# Patient Record
Sex: MALE | Race: WHITE | NOT HISPANIC OR LATINO | Employment: STUDENT | URBAN - METROPOLITAN AREA
[De-identification: names, ages, dates, MRNs, and addresses within clinical notes are randomized per-mention and may not be internally consistent; named-entity substitution may affect disease eponyms.]

---
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
## 2025-01-13 ENCOUNTER — TELEPHONE (OUTPATIENT)
Facility: CLINIC | Age: 20
End: 2025-01-13
Payer: COMMERCIAL

## 2025-01-13 DIAGNOSIS — Z00.00 HEALTHCARE MAINTENANCE: Primary | ICD-10-CM

## 2025-01-13 NOTE — TELEPHONE ENCOUNTER
Per pt, he would like an order for titers for Hep B, needed for paramedic school. He will call back later to schedule his physical for May. He will go to a  lab. Please advise.

## 2025-01-15 ENCOUNTER — LAB (OUTPATIENT)
Dept: LAB | Facility: LAB | Age: 20
End: 2025-01-15
Payer: COMMERCIAL

## 2025-01-15 DIAGNOSIS — Z00.00 HEALTHCARE MAINTENANCE: ICD-10-CM

## 2025-01-15 DIAGNOSIS — D58.2 ELEVATED HEMOGLOBIN (CMS-HCC): ICD-10-CM

## 2025-01-15 LAB
BASOPHILS # BLD AUTO: 0.06 X10*3/UL (ref 0–0.1)
BASOPHILS NFR BLD AUTO: 0.8 %
EOSINOPHIL # BLD AUTO: 0.06 X10*3/UL (ref 0–0.7)
EOSINOPHIL NFR BLD AUTO: 0.8 %
ERYTHROCYTE [DISTWIDTH] IN BLOOD BY AUTOMATED COUNT: 12.7 % (ref 11.5–14.5)
HBV SURFACE AB SER-ACNC: 267.7 MIU/ML
HCT VFR BLD AUTO: 49.1 % (ref 41–52)
HGB BLD-MCNC: 16.6 G/DL (ref 13.5–17.5)
IMM GRANULOCYTES # BLD AUTO: 0.02 X10*3/UL (ref 0–0.7)
IMM GRANULOCYTES NFR BLD AUTO: 0.3 % (ref 0–0.9)
LYMPHOCYTES # BLD AUTO: 2.4 X10*3/UL (ref 1.2–4.8)
LYMPHOCYTES NFR BLD AUTO: 32.8 %
MCH RBC QN AUTO: 29.4 PG (ref 26–34)
MCHC RBC AUTO-ENTMCNC: 33.8 G/DL (ref 32–36)
MCV RBC AUTO: 87 FL (ref 80–100)
MONOCYTES # BLD AUTO: 0.88 X10*3/UL (ref 0.1–1)
MONOCYTES NFR BLD AUTO: 12 %
NEUTROPHILS # BLD AUTO: 3.89 X10*3/UL (ref 1.2–7.7)
NEUTROPHILS NFR BLD AUTO: 53.3 %
NRBC BLD-RTO: 0 /100 WBCS (ref 0–0)
PLATELET # BLD AUTO: 272 X10*3/UL (ref 150–450)
RBC # BLD AUTO: 5.64 X10*6/UL (ref 4.5–5.9)
WBC # BLD AUTO: 7.3 X10*3/UL (ref 4.4–11.3)

## 2025-01-15 PROCEDURE — 86706 HEP B SURFACE ANTIBODY: CPT

## 2025-01-15 PROCEDURE — 85025 COMPLETE CBC W/AUTO DIFF WBC: CPT

## 2025-01-22 ENCOUNTER — TELEPHONE (OUTPATIENT)
Facility: CLINIC | Age: 20
End: 2025-01-22
Payer: COMMERCIAL

## 2025-01-22 NOTE — TELEPHONE ENCOUNTER
Patient called Needs A note from Doctor  for his school stating his Titer was positive,  Needs sign from the doctor .  Please sign and send through to his My Chart. When completed. Please let him Know when it is sent.

## 2025-05-28 ENCOUNTER — TELEPHONE (OUTPATIENT)
Facility: CLINIC | Age: 20
End: 2025-05-28
Payer: COMMERCIAL

## 2025-05-28 DIAGNOSIS — Z13.220 LIPID SCREENING: ICD-10-CM

## 2025-05-28 DIAGNOSIS — Z00.00 HEALTHCARE MAINTENANCE: Primary | ICD-10-CM

## 2025-06-01 LAB
ALBUMIN SERPL-MCNC: 4.9 G/DL (ref 3.6–5.1)
ALP SERPL-CCNC: 55 U/L (ref 36–130)
ALT SERPL-CCNC: 34 U/L (ref 9–46)
ANION GAP SERPL CALCULATED.4IONS-SCNC: 10 MMOL/L (CALC) (ref 7–17)
AST SERPL-CCNC: 20 U/L (ref 10–40)
BASOPHILS # BLD AUTO: 58 CELLS/UL (ref 0–200)
BASOPHILS NFR BLD AUTO: 0.8 %
BILIRUB SERPL-MCNC: 1.1 MG/DL (ref 0.2–1.2)
BUN SERPL-MCNC: 19 MG/DL (ref 7–25)
CALCIUM SERPL-MCNC: 9.6 MG/DL (ref 8.6–10.3)
CHLORIDE SERPL-SCNC: 103 MMOL/L (ref 98–110)
CHOLEST SERPL-MCNC: 154 MG/DL
CHOLEST/HDLC SERPL: 4.3 (CALC)
CO2 SERPL-SCNC: 27 MMOL/L (ref 20–32)
CREAT SERPL-MCNC: 1.05 MG/DL (ref 0.6–1.24)
EGFRCR SERPLBLD CKD-EPI 2021: 104 ML/MIN/1.73M2
EOSINOPHIL # BLD AUTO: 50 CELLS/UL (ref 15–500)
EOSINOPHIL NFR BLD AUTO: 0.7 %
ERYTHROCYTE [DISTWIDTH] IN BLOOD BY AUTOMATED COUNT: 12.8 % (ref 11–15)
GLUCOSE SERPL-MCNC: 87 MG/DL (ref 65–99)
HCT VFR BLD AUTO: 50.2 % (ref 38.5–50)
HDLC SERPL-MCNC: 36 MG/DL
HGB BLD-MCNC: 17 G/DL (ref 13.2–17.1)
IGNF NEG CNTRL BLD: NORMAL
LDLC SERPL CALC-MCNC: 92 MG/DL (CALC)
LYMPHOCYTES # BLD AUTO: 2333 CELLS/UL (ref 850–3900)
LYMPHOCYTES NFR BLD AUTO: 32.4 %
M TB IFN-G BLD-IMP: NEGATIVE
MCH RBC QN AUTO: 29.9 PG (ref 27–33)
MCHC RBC AUTO-ENTMCNC: 33.9 G/DL (ref 32–36)
MCV RBC AUTO: 88.4 FL (ref 80–100)
MITOGEN IGNF.SPOT COUNT BLD: NORMAL
MONOCYTES # BLD AUTO: 871 CELLS/UL (ref 200–950)
MONOCYTES NFR BLD AUTO: 12.1 %
NEUTROPHILS # BLD AUTO: 3888 CELLS/UL (ref 1500–7800)
NEUTROPHILS NFR BLD AUTO: 54 %
NONHDLC SERPL-MCNC: 118 MG/DL (CALC)
PLATELET # BLD AUTO: 254 THOUSAND/UL (ref 140–400)
PMV BLD REES-ECKER: 9.6 FL (ref 7.5–12.5)
POTASSIUM SERPL-SCNC: 3.7 MMOL/L (ref 3.5–5.3)
PROT SERPL-MCNC: 7.5 G/DL (ref 6.1–8.1)
QUEST PANEL A SPOT COUNT: 0
QUEST PANEL B SPOT COUNT: 0
RBC # BLD AUTO: 5.68 MILLION/UL (ref 4.2–5.8)
SODIUM SERPL-SCNC: 140 MMOL/L (ref 135–146)
TRIGL SERPL-MCNC: 162 MG/DL
WBC # BLD AUTO: 7.2 THOUSAND/UL (ref 3.8–10.8)

## 2025-08-05 ENCOUNTER — APPOINTMENT (OUTPATIENT)
Facility: CLINIC | Age: 20
End: 2025-08-05
Payer: COMMERCIAL

## 2025-08-05 VITALS
BODY MASS INDEX: 37.19 KG/M2 | OXYGEN SATURATION: 97 % | SYSTOLIC BLOOD PRESSURE: 136 MMHG | HEIGHT: 77 IN | TEMPERATURE: 98.5 F | RESPIRATION RATE: 18 BRPM | WEIGHT: 315 LBS | DIASTOLIC BLOOD PRESSURE: 68 MMHG | HEART RATE: 94 BPM

## 2025-08-05 DIAGNOSIS — Z00.00 HEALTHCARE MAINTENANCE: Primary | ICD-10-CM

## 2025-08-05 DIAGNOSIS — R03.0 ELEVATED BLOOD PRESSURE READING: ICD-10-CM

## 2025-08-05 DIAGNOSIS — E66.813 CLASS 3 SEVERE OBESITY DUE TO EXCESS CALORIES WITHOUT SERIOUS COMORBIDITY WITH BODY MASS INDEX (BMI) OF 40.0 TO 44.9 IN ADULT: ICD-10-CM

## 2025-08-05 PROCEDURE — 1036F TOBACCO NON-USER: CPT | Performed by: FAMILY MEDICINE

## 2025-08-05 PROCEDURE — 99395 PREV VISIT EST AGE 18-39: CPT | Performed by: FAMILY MEDICINE

## 2025-08-05 PROCEDURE — 3008F BODY MASS INDEX DOCD: CPT | Performed by: FAMILY MEDICINE

## 2025-08-05 ASSESSMENT — PROMIS GLOBAL HEALTH SCALE
RATE_AVERAGE_PAIN: 0
RATE_PHYSICAL_HEALTH: GOOD
RATE_MENTAL_HEALTH: VERY GOOD
RATE_QUALITY_OF_LIFE: VERY GOOD
CARRYOUT_SOCIAL_ACTIVITIES: VERY GOOD
RATE_SOCIAL_SATISFACTION: GOOD
CARRYOUT_PHYSICAL_ACTIVITIES: COMPLETELY
EMOTIONAL_PROBLEMS: RARELY
RATE_GENERAL_HEALTH: GOOD

## 2025-08-05 ASSESSMENT — ENCOUNTER SYMPTOMS
HEADACHES: 0
SHORTNESS OF BREATH: 0

## 2025-08-05 ASSESSMENT — PATIENT HEALTH QUESTIONNAIRE - PHQ9
1. LITTLE INTEREST OR PLEASURE IN DOING THINGS: NOT AT ALL
2. FEELING DOWN, DEPRESSED OR HOPELESS: NOT AT ALL
SUM OF ALL RESPONSES TO PHQ9 QUESTIONS 1 AND 2: 0

## 2025-08-05 NOTE — ASSESSMENT & PLAN NOTE
Orders:    Referral to Nutrition Services; Future    Follow Up In Primary Care - Established; Future

## 2025-08-05 NOTE — ASSESSMENT & PLAN NOTE
Patient was instructed to record blood pressures (using an arm BP monitor) at home 1-2 times per day (per AHA guidelines) and to follow up in office for a blood pressure recheck in 12 weeks.  I also encouraged low-sodium diet and regular exercise.  I also discussed with patient the importance of good blood pressure control to avoid long-term complications such as heart attack and stroke.      Orders:    Follow Up In Primary Care - Established; Future

## 2025-08-05 NOTE — PROGRESS NOTES
"Subjective     Tera Zhang is a 20 y.o. male who presents for Annual Exam.    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  He is doing well.  He also plans to go into [employer] once he passes his paramedic exam.  He is training for the physical fitness test for [employer].    He had labs done fasting through me in may 2025.  His trigs were mildly elevated 162.    He had his hep B vaccines then a positive hep b titer showing immunity in Jan 2025.      He has immunity to MMR and varicella as of titers from 5/2024.      Patient denies snoring, gasping/choking at night, excessive daytime sleepiness.  No hx of MAAME.      Diet - more balanced.  Less junk food. Meal prepping.     Physically active - cardio and weight lifting.     Mood is normal.     Review of Systems   Respiratory:  Negative for shortness of breath.    Cardiovascular:  Negative for chest pain.   Neurological:  Negative for headaches.       Objective     Vitals:    08/05/25 0957 08/05/25 1043   BP: 140/78 136/68   BP Location: Right arm    Patient Position: Sitting    BP Cuff Size: Large adult    Pulse: 94    Resp: 18    Temp: 36.9 °C (98.5 °F)    TempSrc: Temporal    SpO2: 97%    Weight: (!) 158 kg (347 lb 9.6 oz)    Height: 1.95 m (6' 4.77\")         No current outpatient medications     RX Allergies[1]     Surgical History[2]     Social History[3]     Family History[4]     Immunization History   Administered Date(s) Administered    COVID-19, mRNA, LNP-S, PF, 30 mcg/0.3 mL dose 05/13/2021, 06/03/2021, 12/29/2021    DTaP vaccine, pediatric (DAPTACEL) 12/15/2006, 06/11/2009    DTaP, Unspecified 2005, 2005, 2005    Flu vaccine (IIV4), preservative free *Check age/dose* 09/07/2017, 10/03/2018, 10/08/2019, 10/13/2020, 10/19/2021, 10/26/2022    Flu vaccine, trivalent, preservative free, age 6 months and greater (Fluarix/Fluzone/Flulaval) 11/10/2006, 12/15/2008, 10/15/2024    HPV 9-valent " vaccine (GARDASIL 9) 09/07/2017, 10/03/2018    Hepatitis A vaccine, pediatric/adolescent (HAVRIX, VAQTA) 12/30/2013, 12/01/2014    Hepatitis B vaccine, 19 yrs and under (RECOMBIVAX, ENGERIX) 2005, 2005, 2005, 06/04/2024, 12/09/2024    Hepatitis B vaccine, adult *Check Product/Dose* 2005, 07/03/2024    HiB PRP-T conjugate vaccine (HIBERIX, ACTHIB) 06/02/2006    HiB, unspecified 2005, 2005, 2005    Influenza, Unspecified 2005, 01/20/2006, 12/04/2009, 12/01/2014    Influenza, seasonal, injectable 12/04/2007, 12/04/2009, 12/30/2013, 10/07/2016, 09/11/2017    MMR vaccine, subcutaneous (MMR II) 09/08/2006, 06/11/2009    Meningococcal ACWY vaccine (MENVEO) 10/19/2021    Meningococcal ACWY-D (Menactra) 4-valent conjugate vaccine 09/06/2016, 10/19/2021    Pneumococcal Conjugate PCV 7 2005, 2005, 2005, 06/02/2006    Pneumococcal polysaccharide vaccine, 23-valent, age 2 years and older (PNEUMOVAX 23) 2005, 2005, 2005, 06/02/2006    Polio, Unspecified 2005, 2005    Poliovirus vaccine, subcutaneous (IPOL) 2005, 2005, 12/15/2006, 06/11/2009    Tdap vaccine, age 7 year and older (BOOSTRIX, ADACEL) 09/06/2016, 05/17/2024    Varicella vaccine, subcutaneous (VARIVAX) 09/08/2006, 06/11/2009        Lab Results   Component Value Date    WBC 7.2 05/29/2025    RBC 5.68 05/29/2025    HGB 17.0 05/29/2025    HCT 50.2 (H) 05/29/2025    MCV 88.4 05/29/2025    MCH 29.9 05/29/2025    MCHC 33.9 05/29/2025     05/29/2025    MPV 9.6 05/29/2025     Lab Results   Component Value Date    GLUCOSE 87 05/29/2025     05/29/2025    K 3.7 05/29/2025     05/29/2025    CO2 27 05/29/2025    ANIONGAP 10 05/29/2025    BUN 19 05/29/2025    CREATININE 1.05 05/29/2025    CALCIUM 9.6 05/29/2025    ALBUMIN 4.9 05/29/2025    ALKPHOS 55 05/29/2025    PROT 7.5 05/29/2025    AST 20 05/29/2025    BILITOT 1.1 05/29/2025    ALT 34 05/29/2025       Lab Results   Component Value Date    CHOL 154 05/29/2025    HDL 36 (L) 05/29/2025    CHHDL 4.3 05/29/2025    LDLCALC 92 05/29/2025    VLDL 21 05/23/2024    TRIG 162 (H) 05/29/2025      Lab Results   Component Value Date    TSH 1.87 07/10/2023      Lab Results   Component Value Date    VITD25 25 (A) 07/10/2023      Lab Results   Component Value Date    HGBA1C 4.9 05/23/2024    [id number] 94 05/23/2024       Physical Exam  Vitals reviewed.   Constitutional:       General: He is not in acute distress.     Appearance: Normal appearance. He is obese. He is not ill-appearing.   HENT:      Head: Normocephalic and atraumatic.      Right Ear: Tympanic membrane, ear canal and external ear normal.      Left Ear: Tympanic membrane, ear canal and external ear normal.      Mouth/Throat:      Mouth: Mucous membranes are moist.      Pharynx: No oropharyngeal exudate or posterior oropharyngeal erythema.   Neck:      Thyroid: No thyroid mass or thyromegaly.     Cardiovascular:      Rate and Rhythm: Normal rate and regular rhythm.      Heart sounds: No murmur heard.  Pulmonary:      Effort: No respiratory distress.      Breath sounds: Normal breath sounds. No wheezing, rhonchi or rales.   Abdominal:      General: There is no distension.      Palpations: Abdomen is soft.      Tenderness: There is no abdominal tenderness.   Lymphadenopathy:      Cervical: No cervical adenopathy.     Skin:     General: Skin is warm and dry.      Findings: No rash.     Neurological:      Mental Status: He is alert and oriented to person, place, and time. Mental status is at baseline.     Psychiatric:         Mood and Affect: Mood normal.         Behavior: Behavior normal.         Assessment & Plan  Healthcare maintenance  Well Exam     Vaccines - tdap utd     I recommend yearly flu vaccine and routine COVID vaccinations as indicated     Complete labs    Healthy diet, routine exercise was discussed with patient           Class 3 severe obesity due to  excess calories without serious comorbidity with body mass index (BMI) of 40.0 to 44.9 in adult    Orders:    Referral to Nutrition Services; Future    Follow Up In Primary Care - Established; Future    Elevated blood pressure reading  Patient was instructed to record blood pressures (using an arm BP monitor) at home 1-2 times per day (per AHA guidelines) and to follow up in office for a blood pressure recheck in 12 weeks.  I also encouraged low-sodium diet and regular exercise.  I also discussed with patient the importance of good blood pressure control to avoid long-term complications such as heart attack and stroke.      Orders:    Follow Up In Primary Care - Established; Future                      [1] No Known Allergies  [2] History reviewed. No pertinent surgical history.  [3]   Social History  Tobacco Use    Smoking status: Never    Smokeless tobacco: Never   Vaping Use    Vaping status: Never Used   Substance Use Topics    Alcohol use: Not Currently    Drug use: Never   [4]   Family History  Problem Relation Name Age of Onset    No Known Problems Mother      No Known Problems Father      No Known Problems Brother

## 2026-08-06 ENCOUNTER — APPOINTMENT (OUTPATIENT)
Facility: CLINIC | Age: 21
End: 2026-08-06
Payer: COMMERCIAL